# Patient Record
Sex: FEMALE
[De-identification: names, ages, dates, MRNs, and addresses within clinical notes are randomized per-mention and may not be internally consistent; named-entity substitution may affect disease eponyms.]

---

## 2022-02-24 ENCOUNTER — DASHBOARD ENCOUNTERS (OUTPATIENT)
Age: 87
End: 2022-02-24

## 2022-02-25 ENCOUNTER — OFFICE VISIT (OUTPATIENT)
Dept: URBAN - METROPOLITAN AREA TELEHEALTH 2 | Facility: TELEHEALTH | Age: 87
End: 2022-02-25
Payer: MEDICARE

## 2022-02-25 ENCOUNTER — LAB OUTSIDE AN ENCOUNTER (OUTPATIENT)
Dept: URBAN - METROPOLITAN AREA TELEHEALTH 2 | Facility: TELEHEALTH | Age: 87
End: 2022-02-25

## 2022-02-25 VITALS — BODY MASS INDEX: 17.66 KG/M2 | WEIGHT: 96 LBS | HEIGHT: 62 IN

## 2022-02-25 DIAGNOSIS — R10.13 DYSPEPSIA: ICD-10-CM

## 2022-02-25 PROCEDURE — 99204 OFFICE O/P NEW MOD 45 MIN: CPT | Performed by: INTERNAL MEDICINE

## 2022-02-25 RX ORDER — OXYBUTYNIN CHLORIDE 5 MG/1
1 TABLET TABLET, EXTENDED RELEASE ORAL ONCE A DAY
Status: ACTIVE | COMMUNITY

## 2022-02-25 RX ORDER — CARVEDILOL 25 MG/1
1 TABLET WITH FOOD TABLET, FILM COATED ORAL TWICE A DAY
Status: ACTIVE | COMMUNITY

## 2022-02-25 RX ORDER — AMLODIPINE BESYLATE 10 MG/1
1 TABLET TABLET ORAL ONCE A DAY
Status: ACTIVE | COMMUNITY

## 2022-02-25 RX ORDER — ALENDRONATE SODIUM 70 MG/1
1 TABLET 30 MINUTES BEFORE THE FIRST FOOD, BEVERAGE OR MEDICINE OF THE DAY WITH PLAIN WATER TABLET ORAL
Status: ACTIVE | COMMUNITY

## 2022-02-25 RX ORDER — SERTRALINE 50 MG/1
1 TABLET TABLET, FILM COATED ORAL ONCE A DAY
Status: ACTIVE | COMMUNITY

## 2022-02-25 RX ORDER — PANTOPRAZOLE SODIUM 20 MG/1
1 TABLET TABLET, DELAYED RELEASE ORAL ONCE A DAY
Qty: 30 | Refills: 6 | OUTPATIENT

## 2022-02-25 RX ORDER — FUROSEMIDE 20 MG/1
1 TABLET TABLET ORAL ONCE A DAY
Status: ACTIVE | COMMUNITY

## 2022-02-25 RX ORDER — LORAZEPAM 1 MG/1
1 TABLET AT BEDTIME AS NEEDED TABLET ORAL ONCE A DAY
Status: ACTIVE | COMMUNITY

## 2022-02-25 NOTE — HPI-TODAY'S VISIT:
daughter helping with history  dyspepsia months upper abd better with pepto bismol  worse with eating certain foods like beef non radiating epigastric abd pain mild to moderate on blood thinners   went to ER 2/10 and 2/11  Echo 	LVEF is 66%. 2D biplane LVEF assessed without contrast. LV systolic function is normal. LV size is normal. Mild concentric LV hypertrophy. Abnormal septal wall motion consistent with RV pacemaker. Unable to assess diastolic function due to pacing. 	RV mildly dilated. RV systolic function is normal. 	LA mildly dilated. RA moderately dilated. Pacer lead in the RA. 	Aortic valve not well visualized. Aortic valve mildly thickened. No aortic regurgitation. No hemodynamically significant aortic valve stenosis. 	Severe mitral annular calcification. Calcification is circumferential. Calcified mitral apparatus. Mild to moderate mitral regurgitation. Mild mitral valve stenosis. Mean MV gradient 4 mmHg at HR 60 bpm. 	Tricuspid valve mildly thickened. Moderate to severe tricuspid regurgitation. 	Severely elevated RV systolic pressure. Estimated RVSP 73 mmHg + RA pressure (IVC not visualized). 	Aortic root normal. 	No pericardial effusion 	No previous echocardiogram for comparison.  CT chest .  No acute pulmonary embolus. Limited evaluation the bibasilar subsegmental pulmonary arteries due to respiratory motion artifact.   2.  Suboptimal contrast bolus within the aorta limiting evaluation for aortic dissection. No aneurysmal dilatation.   3.  Mild right-sided cardiomegaly and elevated right atrial pressures. 4.  Mild bronchiolitis of the right upper lobe inferior segment of likely infectious/inflammatory etiology.   5.  Bibasilar atelectasis and moderate emphysema.   6.  Additional chronic changes of the large sliding hiatal hernia, emphysema, constipation, colonic diverticulosis, thoracic kyphosis, indeterminate right renal cortical hypodensity and advanced degenerative changes of the shoulders.  LE doppler  Negative bilateral lower extremity Doppler venous study.

## 2022-08-08 ENCOUNTER — ERX REFILL RESPONSE (OUTPATIENT)
Dept: URBAN - METROPOLITAN AREA CLINIC 109 | Facility: CLINIC | Age: 87
End: 2022-08-08

## 2022-08-08 RX ORDER — OMEPRAZOLE 40 MG/1
1 CAPSULE 30 MINUTES BEFORE MORNING MEAL CAPSULE, DELAYED RELEASE ORAL ONCE A DAY
Qty: 90 | Refills: 2 | OUTPATIENT